# Patient Record
Sex: FEMALE | Employment: UNEMPLOYED | URBAN - METROPOLITAN AREA
[De-identification: names, ages, dates, MRNs, and addresses within clinical notes are randomized per-mention and may not be internally consistent; named-entity substitution may affect disease eponyms.]

---

## 2019-11-13 ENCOUNTER — OFFICE VISIT (OUTPATIENT)
Dept: URGENT CARE | Facility: CLINIC | Age: 29
End: 2019-11-13
Payer: MEDICAID

## 2019-11-13 VITALS
WEIGHT: 179 LBS | RESPIRATION RATE: 14 BRPM | TEMPERATURE: 97.8 F | HEART RATE: 77 BPM | SYSTOLIC BLOOD PRESSURE: 120 MMHG | OXYGEN SATURATION: 97 % | BODY MASS INDEX: 32.94 KG/M2 | HEIGHT: 62 IN | DIASTOLIC BLOOD PRESSURE: 82 MMHG

## 2019-11-13 DIAGNOSIS — R05.9 COUGH: ICD-10-CM

## 2019-11-13 DIAGNOSIS — H92.01 ACUTE EAR PAIN, RIGHT: ICD-10-CM

## 2019-11-13 DIAGNOSIS — J02.0 STREP THROAT: Primary | ICD-10-CM

## 2019-11-13 PROCEDURE — 99203 OFFICE O/P NEW LOW 30 MIN: CPT | Performed by: PHYSICIAN ASSISTANT

## 2019-11-13 PROCEDURE — 87880 STREP A ASSAY W/OPTIC: CPT | Performed by: PHYSICIAN ASSISTANT

## 2019-11-13 RX ORDER — ACETAMINOPHEN 325 MG/1
650 TABLET ORAL EVERY 4 HOURS PRN
COMMUNITY

## 2019-11-13 RX ORDER — AMOXICILLIN 875 MG/1
875 TABLET, COATED ORAL 2 TIMES DAILY
Qty: 20 TAB | Refills: 0 | Status: SHIPPED | OUTPATIENT
Start: 2019-11-13 | End: 2019-11-23

## 2019-11-13 RX ORDER — DEXAMETHASONE 4 MG/1
4 TABLET ORAL 2 TIMES DAILY
Qty: 6 TAB | Refills: 0 | Status: SHIPPED | OUTPATIENT
Start: 2019-11-13 | End: 2019-11-16

## 2019-11-13 NOTE — LETTER
November 13, 2019         Patient: Reyna Parisi   YOB: 1990   Date of Visit: 11/13/2019           To Whom it May Concern:    Reyna Parisi was seen in my clinic on 11/13/2019. She may return to work on 11/18/2019.    If you have any questions or concerns, please don't hesitate to call.        Sincerely,           Viridiana Peace P.A.-C.  Electronically Signed

## 2019-11-14 LAB
INT CON NEG: NEGATIVE
INT CON POS: POSITIVE
S PYO AG THROAT QL: POSITIVE

## 2019-11-14 NOTE — PROGRESS NOTES
Subjective:      Reyna Parisi is a 29 y.o. female who presents with Pharyngitis (cough, ear pain Rt x1 week )    PMH:  has a past medical history of Amniotic fluid index decreased (7/2/2010), Cold (11/8/16), and Snoring.    MEDS:   Current Outpatient Medications:   •  acetaminophen (TYLENOL) 325 MG Tab, Take 650 mg by mouth every four hours as needed., Disp: , Rfl:   •  Pseudoeph-Doxylamine-DM-APAP (NYQUIL PO), Take  by mouth., Disp: , Rfl:   •  Pseudoephedrine-APAP-DM (DAYQUIL PO), Take  by mouth., Disp: , Rfl:   •  ibuprofen (MOTRIN) 200 MG Tab, Take 400 mg by mouth every 6 hours as needed., Disp: , Rfl:   ALLERGIES:   Allergies   Allergen Reactions   • Nkda [No Known Drug Allergy]      SURGHX:   Past Surgical History:   Procedure Laterality Date   • TUBAL COAGULATION LAPAROSCOPIC BILATERAL  11/26/2016    Procedure: TUBAL COAGULATION LAPAROSCOPIC BILATERAL;  Surgeon: Garret Mckeon M.D.;  Location: SURGERY [facility];  Service:    • GYN SURGERY  4/2015    D&C     SOCHX:  reports that she has never smoked. She has never used smokeless tobacco. She reports current drug use. She reports that she does not drink alcohol.  FH: Reviewed with patient, not pertinent to this visit.           Patient presents with:  Pharyngitis: cough, ear pain Rt x1 week         Pharyngitis    This is a new problem. The current episode started in the past 7 days. The problem has been gradually worsening. The pain is worse on the right side. The maximum temperature recorded prior to her arrival was 100.4 - 100.9 F. The pain is at a severity of 7/10. The pain is moderate. Associated symptoms include congestion, coughing, ear pain, headaches, a plugged ear sensation and swollen glands. Pertinent negatives include no ear discharge or shortness of breath. She has tried cool liquids, gargles and NSAIDs for the symptoms. The treatment provided mild relief.       Review of Systems   Constitutional: Positive for fever.   HENT:  "Positive for congestion, ear pain and sore throat. Negative for ear discharge.    Respiratory: Positive for cough. Negative for sputum production, shortness of breath and wheezing.    Skin: Negative for rash.   Neurological: Positive for headaches.   All other systems reviewed and are negative.         Objective:     /82   Pulse 77   Temp 36.6 °C (97.8 °F)   Resp 14   Ht 1.575 m (5' 2\")   Wt 81.2 kg (179 lb)   SpO2 97%   BMI 32.74 kg/m²      Physical Exam  Vitals signs and nursing note reviewed.   Constitutional:       General: She is not in acute distress.     Appearance: Normal appearance. She is well-developed. She is obese. She is not ill-appearing or toxic-appearing.   HENT:      Head: Normocephalic and atraumatic.      Right Ear: Tympanic membrane normal.      Left Ear: Tympanic membrane normal.      Nose: Nose normal.      Mouth/Throat:      Lips: Pink.      Mouth: Mucous membranes are moist.      Pharynx: Uvula midline. Posterior oropharyngeal erythema present. No uvula swelling.      Tonsils: No tonsillar exudate. Swellin+ on the right. 2+ on the left.   Eyes:      Extraocular Movements: Extraocular movements intact.      Conjunctiva/sclera: Conjunctivae normal.      Pupils: Pupils are equal, round, and reactive to light.   Neck:      Musculoskeletal: Normal range of motion and neck supple.   Cardiovascular:      Rate and Rhythm: Normal rate and regular rhythm.      Heart sounds: Normal heart sounds.   Pulmonary:      Effort: Pulmonary effort is normal.      Breath sounds: Normal breath sounds.   Abdominal:      Palpations: Abdomen is soft.   Musculoskeletal: Normal range of motion.   Lymphadenopathy:      Cervical: No cervical adenopathy.   Skin:     General: Skin is warm and dry.      Capillary Refill: Capillary refill takes less than 2 seconds.   Neurological:      General: No focal deficit present.      Mental Status: She is alert and oriented to person, place, and time.      Gait: Gait " normal.   Psychiatric:         Mood and Affect: Mood normal.         Behavior: Behavior is cooperative.            Strep: positive     Assessment/Plan:     1. Strep throat  amoxicillin (AMOXIL) 875 MG tablet    dexamethasone (DECADRON) 4 MG Tab   2. Acute ear pain, right  POCT Rapid Strep A    amoxicillin (AMOXIL) 875 MG tablet    dexamethasone (DECADRON) 4 MG Tab   3. Cough  amoxicillin (AMOXIL) 875 MG tablet    dexamethasone (DECADRON) 4 MG Tab     PT can continue OTC medications, increase fluids and rest until symptoms improve.     PT advised saltwater gargles/swishes  3-4 times daily until symptoms improve.     PT should follow up with PCP in 1-2 days for re-evaluation if symptoms have not improved.  Discussed red flags and reasons to return to UC or ED.  Pt and/or family verbalized understanding of diagnosis and follow up instructions and was offered informational handout on diagnosis.  PT discharged.

## 2019-11-17 ASSESSMENT — ENCOUNTER SYMPTOMS
SPUTUM PRODUCTION: 0
FEVER: 1
SWOLLEN GLANDS: 1
COUGH: 1
SHORTNESS OF BREATH: 0
HEADACHES: 1
SORE THROAT: 1
WHEEZING: 0

## 2022-07-12 ENCOUNTER — NON-PROVIDER VISIT (OUTPATIENT)
Dept: OCCUPATIONAL MEDICINE | Facility: CLINIC | Age: 32
End: 2022-07-12

## 2022-07-12 DIAGNOSIS — Z11.1 ENCOUNTER FOR PPD TEST: Primary | ICD-10-CM

## 2022-07-12 PROCEDURE — 86580 TB INTRADERMAL TEST: CPT | Performed by: NURSE PRACTITIONER

## 2022-07-15 ENCOUNTER — NON-PROVIDER VISIT (OUTPATIENT)
Dept: OCCUPATIONAL MEDICINE | Facility: CLINIC | Age: 32
End: 2022-07-15

## 2022-07-15 LAB — TB WHEAL 3D P 5 TU DIAM: NORMAL MM

## 2022-07-19 ENCOUNTER — NON-PROVIDER VISIT (OUTPATIENT)
Dept: OCCUPATIONAL MEDICINE | Facility: CLINIC | Age: 32
End: 2022-07-19

## 2022-07-19 DIAGNOSIS — Z11.1 ENCOUNTER FOR PPD TEST: Primary | ICD-10-CM

## 2022-07-19 PROCEDURE — 86580 TB INTRADERMAL TEST: CPT | Performed by: NURSE PRACTITIONER

## 2022-07-21 ENCOUNTER — NON-PROVIDER VISIT (OUTPATIENT)
Dept: OCCUPATIONAL MEDICINE | Facility: CLINIC | Age: 32
End: 2022-07-21

## 2022-07-21 LAB — TB WHEAL 3D P 5 TU DIAM: 0 MM

## 2022-12-02 ENCOUNTER — OFFICE VISIT (OUTPATIENT)
Dept: URGENT CARE | Facility: PHYSICIAN GROUP | Age: 32
End: 2022-12-02

## 2022-12-02 VITALS
SYSTOLIC BLOOD PRESSURE: 130 MMHG | DIASTOLIC BLOOD PRESSURE: 80 MMHG | WEIGHT: 185 LBS | TEMPERATURE: 98.8 F | RESPIRATION RATE: 18 BRPM | OXYGEN SATURATION: 98 % | HEART RATE: 110 BPM | BODY MASS INDEX: 34.04 KG/M2 | HEIGHT: 62 IN

## 2022-12-02 DIAGNOSIS — J02.0 PHARYNGITIS DUE TO STREPTOCOCCUS SPECIES: ICD-10-CM

## 2022-12-02 PROCEDURE — 99203 OFFICE O/P NEW LOW 30 MIN: CPT | Performed by: NURSE PRACTITIONER

## 2022-12-02 RX ORDER — DEXAMETHASONE SODIUM PHOSPHATE 10 MG/ML
10 INJECTION INTRAMUSCULAR; INTRAVENOUS ONCE
Status: COMPLETED | OUTPATIENT
Start: 2022-12-02 | End: 2022-12-02

## 2022-12-02 RX ORDER — AMOXICILLIN 500 MG/1
500 CAPSULE ORAL 2 TIMES DAILY
Qty: 20 CAPSULE | Refills: 0 | Status: SHIPPED | OUTPATIENT
Start: 2022-12-02 | End: 2022-12-12

## 2022-12-02 RX ADMIN — DEXAMETHASONE SODIUM PHOSPHATE 10 MG: 10 INJECTION INTRAMUSCULAR; INTRAVENOUS at 18:55

## 2022-12-02 NOTE — LETTER
December 2, 2022    To Whom It May Concern:         This is confirmation that Reyna Kenyatta Parisi attended her scheduled appointment with ROLANDO Eric on 12/02/22. Please excuse from work due to strep throat. 12/3/22 though 12/4/22. May return 12/5/22.          If you have any questions please do not hesitate to call me at the phone number listed below.    Sincerely,          SARAI EricRJeanineN.  [phone]

## 2022-12-03 NOTE — PROGRESS NOTES
Patient has consented to treatment and for use of patient information for treatment and billing purposes.    Date: 12/02/22     Arrival Mode: Private Vehicle / Ambulatory    Chief Complaint:    Chief Complaint   Patient presents with    Headache     X 5 days Body aches, sore throat x 1 day        History of Present Illness: 32 y.o. female  presents to clinic with 5 days of headache, body aches and nasal congestion.  Patient states she began having a sore throat yesterday.  States she is having painful swallowing and eating.  Denies cough.  No shortness of breath chest pain or leg swelling.  Mild nausea no vomiting or diarrhea.      ROS:  As stated in HPI     Pertinent Medical History:    Past Medical History:   Diagnosis Date    Amniotic fluid index decreased 7/2/2010    Cold 11/8/16    Snoring         Pertinent Surgical History:    Past Surgical History:   Procedure Laterality Date    TUBAL COAGULATION LAPAROSCOPIC BILATERAL  11/26/2016    Procedure: TUBAL COAGULATION LAPAROSCOPIC BILATERAL;  Surgeon: Garret Mckeon M.D.;  Location: SURGERY [facility];  Service:     GYN SURGERY  4/2015    D&C        Current  Medications:  Current Outpatient Medications on File Prior to Visit   Medication Sig Dispense Refill    acetaminophen (TYLENOL) 325 MG Tab Take 650 mg by mouth every four hours as needed. (Patient not taking: Reported on 12/2/2022)      Pseudoeph-Doxylamine-DM-APAP (NYQUIL PO) Take  by mouth. (Patient not taking: Reported on 12/2/2022)      Pseudoephedrine-APAP-DM (DAYQUIL PO) Take  by mouth. (Patient not taking: Reported on 12/2/2022)      ibuprofen (MOTRIN) 200 MG Tab Take 400 mg by mouth every 6 hours as needed. (Patient not taking: Reported on 12/2/2022)       No current facility-administered medications on file prior to visit.        Allergies:     Nkda [no known drug allergy]     Social History:   Social History     Socioeconomic History    Marital status: Single     Spouse name: Not on file     Number of children: Not on file    Years of education: Not on file    Highest education level: Not on file   Occupational History    Not on file   Tobacco Use    Smoking status: Never    Smokeless tobacco: Never   Vaping Use    Vaping Use: Never used   Substance and Sexual Activity    Alcohol use: No    Drug use: Not Currently     Comment: marijuana    Sexual activity: Yes     Partners: Male     Comment: NONE. Planned pregnancy   Other Topics Concern    Not on file   Social History Narrative    Not on file     Social Determinants of Health     Financial Resource Strain: Not on file   Food Insecurity: Not on file   Transportation Needs: Not on file   Physical Activity: Not on file   Stress: Not on file   Social Connections: Not on file   Intimate Partner Violence: Not on file   Housing Stability: Not on file        No LMP recorded.       Physical Exam:  Vitals:    12/02/22 1836   BP: 130/80   Pulse: (!) 110   Resp: 18   Temp: 37.1 °C (98.8 °F)   SpO2: 98%        Physical Exam  Vitals reviewed.   Constitutional:       General: She is not in acute distress.     Appearance: Normal appearance. She is well-developed. She is not toxic-appearing.   HENT:      Head: Normocephalic and atraumatic.      Right Ear: Tympanic membrane, ear canal and external ear normal.      Left Ear: Tympanic membrane, ear canal and external ear normal.      Nose: Nose normal.      Mouth/Throat:      Lips: Pink.      Mouth: Mucous membranes are moist.      Pharynx: Oropharyngeal exudate and posterior oropharyngeal erythema present.      Tonsils: Tonsillar exudate present. No tonsillar abscesses. 3+ on the right. 3+ on the left.   Eyes:      General: Lids are normal. Gaze aligned appropriately. No allergic shiner or scleral icterus.     Extraocular Movements: Extraocular movements intact.      Conjunctiva/sclera: Conjunctivae normal.      Pupils: Pupils are equal, round, and reactive to light.   Cardiovascular:      Rate and Rhythm: Normal rate  and regular rhythm.      Pulses:           Radial pulses are 2+ on the right side and 2+ on the left side.      Heart sounds: Normal heart sounds.   Pulmonary:      Effort: Pulmonary effort is normal.      Breath sounds: Normal breath sounds. No wheezing.   Musculoskeletal:      Right lower leg: No edema.      Left lower leg: No edema.   Lymphadenopathy:      Cervical: Cervical adenopathy present.      Right cervical: Superficial cervical adenopathy present.   Skin:     General: Skin is warm.      Capillary Refill: Capillary refill takes less than 2 seconds.      Coloration: Skin is not cyanotic or pale.      Findings: No rash.   Neurological:      Mental Status: She is alert.      Gait: Gait is intact.   Psychiatric:         Behavior: Behavior normal. Behavior is cooperative.        Diagnostics:    None     Medical Decision Making:  I personally reviewed prior external notes and test results pertinent to today's visit.   Shared decision-making was utilized with patient did develop treatment plan and clinic course. Camille, 32 y.o. female 5 days of headache nasal congestion body aches with a worsening sore throat in the past 24 hours.  Patient's presenting signs and symptoms as well as exam findings are consistent with strep pharyngitis.  Will treat with amoxicillin 10-day course.  We will also give in clinic Decadron due to significant tonsillar swelling to help alleviate pain.  Recommend changing toothbrush on day 3 of antibiotics.    Did advise patient on conservative measures for management of symptoms.  Patient is agreeable to pursue adequate rest, adequate hydration, saltwater gargle and Neti pot for any symptoms of upper respiratory congestion.  Over-the-counter analgesia and antipyretics on a p.r.n. basis as needed for pain and fever.  Did discuss over-the-counter cough medications.      Patient will monitor symptoms closely for worsening and is advised to seek further evaluation the emergency room if alarm  signs or symptoms arise.  Patient states understanding and verbalizes agreement with this plan of care.    Plan:    1. Pharyngitis due to Streptococcus species    - amoxicillin (AMOXIL) 500 MG Cap; Take 1 Capsule by mouth 2 times a day for 10 days.  Dispense: 20 Capsule; Refill: 0  - dexamethasone (DECADRON) injection (check route below) 10 mg       Disposition:  Patient was discharged in stable condition.    Voice Recognition Disclaimer: Portions of this document were created using voice recognition software. The software does have a chance of producing errors of grammar and possibly content. I have made every reasonable attempt to correct obvious errors, but there may be errors of grammar and possibly content that I did not discover before finalizing the documentation.    Bhavani Us, FREDI.P.R.N.

## 2023-12-12 ENCOUNTER — NON-PROVIDER VISIT (OUTPATIENT)
Dept: URGENT CARE | Facility: PHYSICIAN GROUP | Age: 33
End: 2023-12-12

## 2023-12-12 DIAGNOSIS — Z11.1 PPD SCREENING TEST: ICD-10-CM

## 2023-12-12 PROCEDURE — 86580 TB INTRADERMAL TEST: CPT | Performed by: PHYSICIAN ASSISTANT

## 2023-12-13 NOTE — PROGRESS NOTES
Reyna Parisi is a 33 y.o. female here for a non-provider visit for PPD placement -- Step 1 of 1    Reason for PPD:  work requirement    1. TB evaluation questionnaire completed by patient? Yes      -  If any answers marked yes did you contact a provider prior to placing? Yes  2.  Patient notified to return to clinic for reading on: 12/14-12/15/23  3.  PPD Placement documentation completed on TB evaluation questionnaire? Yes  4.  Location of TB evaluation questionnaire filed: [facility]

## 2023-12-17 ENCOUNTER — NON-PROVIDER VISIT (OUTPATIENT)
Dept: URGENT CARE | Facility: PHYSICIAN GROUP | Age: 33
End: 2023-12-17

## 2023-12-17 DIAGNOSIS — Z11.1 PPD SCREENING TEST: Primary | ICD-10-CM

## 2023-12-17 PROCEDURE — 86580 TB INTRADERMAL TEST: CPT | Performed by: STUDENT IN AN ORGANIZED HEALTH CARE EDUCATION/TRAINING PROGRAM

## 2023-12-17 NOTE — PROGRESS NOTES
Reyna Parisi is a 33 y.o. female here for a non-provider visit for PPD placement -- Step 1 of 1    Reason for PPD:  work requirement    1. TB evaluation questionnaire completed by patient? Yes      -  If any answers marked yes did you contact a provider prior to placing? Not Indicated  2.  Patient notified to return to clinic for reading on: BETWEEN 12/19/23 and 12/20/2023  3.  PPD Placement documentation completed on TB evaluation questionnaire? YES  4.  Location of TB evaluation questionnaire filed: [facility]

## 2023-12-19 ENCOUNTER — NON-PROVIDER VISIT (OUTPATIENT)
Dept: URGENT CARE | Facility: PHYSICIAN GROUP | Age: 33
End: 2023-12-19

## 2023-12-19 LAB — TB WHEAL 3D P 5 TU DIAM: 0 MM

## 2023-12-19 NOTE — PROGRESS NOTES
Reyna You Ailin is a 33 y.o. female here for a non-provider visit for PPD reading -- Step 1 of 1.      1.  Resulted in Epic under enter/edit results? Yes   2.  TB evaluation questionnaire scanned into chart and original given to patient?Yes      3. Was induration greater than 0 mm? No.        Routed to PCP? Yes